# Patient Record
Sex: FEMALE | Race: OTHER | HISPANIC OR LATINO
[De-identification: names, ages, dates, MRNs, and addresses within clinical notes are randomized per-mention and may not be internally consistent; named-entity substitution may affect disease eponyms.]

---

## 2020-09-18 ENCOUNTER — TRANSCRIPTION ENCOUNTER (OUTPATIENT)
Age: 45
End: 2020-09-18

## 2020-09-19 ENCOUNTER — TRANSCRIPTION ENCOUNTER (OUTPATIENT)
Age: 45
End: 2020-09-19

## 2020-10-18 ENCOUNTER — TRANSCRIPTION ENCOUNTER (OUTPATIENT)
Age: 45
End: 2020-10-18

## 2020-11-11 ENCOUNTER — TRANSCRIPTION ENCOUNTER (OUTPATIENT)
Age: 45
End: 2020-11-11

## 2020-12-20 ENCOUNTER — TRANSCRIPTION ENCOUNTER (OUTPATIENT)
Age: 45
End: 2020-12-20

## 2021-02-16 ENCOUNTER — TRANSCRIPTION ENCOUNTER (OUTPATIENT)
Age: 46
End: 2021-02-16

## 2021-10-31 ENCOUNTER — TRANSCRIPTION ENCOUNTER (OUTPATIENT)
Age: 46
End: 2021-10-31

## 2022-06-28 PROBLEM — Z00.00 ENCOUNTER FOR PREVENTIVE HEALTH EXAMINATION: Status: ACTIVE | Noted: 2022-06-28

## 2022-09-09 ENCOUNTER — APPOINTMENT (OUTPATIENT)
Dept: GASTROENTEROLOGY | Facility: CLINIC | Age: 47
End: 2022-09-09

## 2023-07-10 ENCOUNTER — APPOINTMENT (OUTPATIENT)
Dept: ORTHOPEDIC SURGERY | Facility: CLINIC | Age: 48
End: 2023-07-10
Payer: COMMERCIAL

## 2023-07-10 VITALS — WEIGHT: 190 LBS | BODY MASS INDEX: 28.79 KG/M2 | HEIGHT: 68 IN

## 2023-07-10 DIAGNOSIS — S89.92XA UNSPECIFIED INJURY OF LEFT LOWER LEG, INITIAL ENCOUNTER: ICD-10-CM

## 2023-07-10 PROCEDURE — 99203 OFFICE O/P NEW LOW 30 MIN: CPT

## 2023-07-10 RX ORDER — NAPROXEN 500 MG/1
500 TABLET ORAL
Qty: 60 | Refills: 0 | Status: ACTIVE | COMMUNITY
Start: 2023-07-10 | End: 1900-01-01

## 2023-07-10 NOTE — DISCUSSION/SUMMARY
[de-identified] : Discussed x-rays detail, no acute fractures, subluxations, dislocations.  MRI ordered to evaluate internal derangement/meniscal tear.  Discussed treatment options at this time including rest, ice/heat, elevation, range of motion exercise.  Naproxen sent to patient's pharmacy, discussed side effects in detail.  Prescription given for hinged knee brace.  Call 2 to 3 days after MRI discuss results in detail.  Patient will follow-up with sports department in 6 weeks for further evaluation.  Patient understands agrees with plan.  Call with any questions or concerns.  Advised activity modification.

## 2023-07-10 NOTE — PHYSICAL EXAM
[Left] : left knee [Positive] : positive Octaviano [] : no extensor lag [TWNoteComboBox7] : flexion 125 degrees [de-identified] : extension 0 degrees

## 2023-07-10 NOTE — HISTORY OF PRESENT ILLNESS
[de-identified] : Patient is a 48-year-old female here for evaluation of left knee injury.  Patient states on 7/1/2023 she tripped and twisted her left knee.  Patient states that she does not believe that her left knee hit the ground.  Patient was immediate pain and walking and left a limp.  Patient went to urgent care, x-rays taken and read as negative.  Patient was placed in knee immobilizer and told to follow-up with orthopedics.  Patient states that when she does not use the knee immobilizer she feels like her knee does buckle and give out.  Denies numbness/tingling.

## 2023-07-10 NOTE — DATA REVIEWED
[I reviewed the films/CD and agree] : I reviewed the films/CD and agree [FreeTextEntry1] : X-ray left knee: No acute fractures, subluxations, dislocations.

## 2023-07-21 ENCOUNTER — APPOINTMENT (OUTPATIENT)
Dept: MRI IMAGING | Facility: CLINIC | Age: 48
End: 2023-07-21

## 2023-08-16 ENCOUNTER — APPOINTMENT (OUTPATIENT)
Dept: ORTHOPEDIC SURGERY | Facility: CLINIC | Age: 48
End: 2023-08-16

## 2023-08-23 ENCOUNTER — APPOINTMENT (OUTPATIENT)
Dept: ORTHOPEDIC SURGERY | Facility: CLINIC | Age: 48
End: 2023-08-23

## 2025-09-08 ENCOUNTER — NON-APPOINTMENT (OUTPATIENT)
Age: 50
End: 2025-09-08